# Patient Record
Sex: FEMALE | Race: WHITE | NOT HISPANIC OR LATINO | URBAN - METROPOLITAN AREA
[De-identification: names, ages, dates, MRNs, and addresses within clinical notes are randomized per-mention and may not be internally consistent; named-entity substitution may affect disease eponyms.]

---
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
## 2020-06-23 PROBLEM — Z00.00 ENCOUNTER FOR PREVENTIVE HEALTH EXAMINATION: Status: ACTIVE | Noted: 2020-06-23

## 2020-12-01 ENCOUNTER — TRANSCRIPTION ENCOUNTER (OUTPATIENT)
Age: 20
End: 2020-12-01

## 2020-12-17 ENCOUNTER — NON-APPOINTMENT (OUTPATIENT)
Age: 20
End: 2020-12-17

## 2020-12-18 ENCOUNTER — NON-APPOINTMENT (OUTPATIENT)
Age: 20
End: 2020-12-18

## 2020-12-18 ENCOUNTER — APPOINTMENT (OUTPATIENT)
Dept: HEART AND VASCULAR | Facility: CLINIC | Age: 20
End: 2020-12-18
Payer: COMMERCIAL

## 2020-12-18 VITALS
BODY MASS INDEX: 16.56 KG/M2 | HEART RATE: 75 BPM | WEIGHT: 97 LBS | DIASTOLIC BLOOD PRESSURE: 60 MMHG | SYSTOLIC BLOOD PRESSURE: 100 MMHG | HEIGHT: 64 IN

## 2020-12-18 VITALS — TEMPERATURE: 97.8 F

## 2020-12-18 DIAGNOSIS — E78.5 HYPERLIPIDEMIA, UNSPECIFIED: ICD-10-CM

## 2020-12-18 DIAGNOSIS — Z85.820 PERSONAL HISTORY OF MALIGNANT MELANOMA OF SKIN: ICD-10-CM

## 2020-12-18 PROCEDURE — 99072 ADDL SUPL MATRL&STAF TM PHE: CPT

## 2020-12-18 PROCEDURE — 99204 OFFICE O/P NEW MOD 45 MIN: CPT

## 2020-12-18 PROCEDURE — 93000 ELECTROCARDIOGRAM COMPLETE: CPT

## 2020-12-18 RX ORDER — NORGESTIMATE AND ETHINYL ESTRADIOL 0.25-0.035
KIT ORAL
Refills: 0 | Status: ACTIVE | COMMUNITY

## 2020-12-18 NOTE — HISTORY OF PRESENT ILLNESS
[FreeTextEntry1] : 21 yo F with PMHx of melanoma s/p resection (2014) who presents for evaluation of hyperlipidemia. \par \par Her mother is a patient of our clinic. She had blood work done March 2020 which showed an , , HDL 55 and VLDL 38. Two years prior 6/2018 to that her TG was 139, , HDL 48. She was advised to change her diet.  Typical diet includes foods such as "protein plant based pancakes," eggs with toast, pasta, uses cheese with pancakes, sandwiches. Cooks with butter, eats red meat 4 times per week. Started taking birth control 2016 tri-sprintec. She denies any symptoms including chest pain, dyspnea, palpitations, LE edema or palpitations.\par \par She feels that she is losing weight in the past year for unclear reasons and is unsure why. She is hungry when she wakes up and eats three meals per day. \par \par Father has high HLD, not sure when he was diagnosed.Says both her grandparents have "heart problems."\par \par She visiting home from [place], returns Jan 25th. 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 \par \par Relevant labs:\par _______________________________\par 3/6/2020: \par , , HDL 55, VLDL 38\par \par 6/5/2018\par ,,  HDL 48, VLDL 28\par \par \par Do you have polycystic ovarian syndrome? no		\par Have you ever been pregnant?  no		If so, how many times?\par Did you have any complications during pregnancy? -\par Did you have any postpartum complications? -\par Have you had any miscarriages? -	If so, how many? \par Have you gone through menopause? -	If yes, at what age? \par Did you receive hormone replacement?	-\par \par Lifestyle History:\par Mediterranean Diet Score (9 question survey) was 4\par (8-9: optimal, 6-7: near-optimal, 4-5: suboptimal, 0-3: markedly suboptimal)\par Exercise: Patient reports exercising <40  minutes per week. \par Smoking: Never smoker \par Stress: Patient reports stress several days \par \par  \par \par \par \par

## 2020-12-18 NOTE — DISCUSSION/SUMMARY
[FreeTextEntry1] : 21 yo F with PMHx of melanoma s/p resection (2014) who presents for evaluation of hyperlipidemia. \par \par Dyslipidemia\par - May be multifactorial - hereditary/primary vs lifestyle vs ?OCP (less likely)  \par - 3/2020: , , HDL 55, VLDL 38\par - Will repeat lipid profile and lp(a) today\par - Recommended she attempt dietary modifications, regular exercise and we will recheck again in a few months\par - She will make a follow up with us in April, 2021

## 2020-12-18 NOTE — PHYSICAL EXAM
[General Appearance - Well Developed] : well developed [Normal Appearance] : normal appearance [Well Groomed] : well groomed [General Appearance - Well Nourished] : well nourished [No Deformities] : no deformities [General Appearance - In No Acute Distress] : no acute distress [Heart Rate And Rhythm] : heart rate and rhythm were normal [Heart Sounds] : normal S1 and S2 [Murmurs] : no murmurs present [Respiration, Rhythm And Depth] : normal respiratory rhythm and effort [Exaggerated Use Of Accessory Muscles For Inspiration] : no accessory muscle use [Auscultation Breath Sounds / Voice Sounds] : lungs were clear to auscultation bilaterally [Abdomen Soft] : soft [Abdomen Tenderness] : non-tender [Abdomen Mass (___ Cm)] : no abdominal mass palpated [Abnormal Walk] : normal gait [Gait - Sufficient For Exercise Testing] : the gait was sufficient for exercise testing [Nail Clubbing] : no clubbing of the fingernails [Cyanosis, Localized] : no localized cyanosis [Petechial Hemorrhages (___cm)] : no petechial hemorrhages [Skin Color & Pigmentation] : normal skin color and pigmentation [] : no rash [No Venous Stasis] : no venous stasis [Skin Lesions] : no skin lesions [No Skin Ulcers] : no skin ulcer [No Xanthoma] : no  xanthoma was observed [Oriented To Time, Place, And Person] : oriented to person, place, and time [Affect] : the affect was normal [Mood] : the mood was normal [No Anxiety] : not feeling anxious

## 2020-12-22 ENCOUNTER — NON-APPOINTMENT (OUTPATIENT)
Age: 20
End: 2020-12-22

## 2020-12-22 LAB
APO LP(A) SERPL-MCNC: 60 NMOL/L
CHOLEST SERPL-MCNC: 256 MG/DL
HDLC SERPL-MCNC: 51 MG/DL
LDLC SERPL CALC-MCNC: 177 MG/DL
NONHDLC SERPL-MCNC: 205 MG/DL
TRIGL SERPL-MCNC: 139 MG/DL

## 2021-05-17 ENCOUNTER — TRANSCRIPTION ENCOUNTER (OUTPATIENT)
Age: 21
End: 2021-05-17

## 2021-08-16 ENCOUNTER — APPOINTMENT (OUTPATIENT)
Dept: HEART AND VASCULAR | Facility: CLINIC | Age: 21
End: 2021-08-16

## 2022-11-22 ENCOUNTER — APPOINTMENT (OUTPATIENT)
Dept: OBGYN | Facility: CLINIC | Age: 22
End: 2022-11-22

## 2022-11-28 NOTE — ED ADULT NURSE NOTE - FALL HARM RISK CONCLUSION
Head,  normocephalic,  atraumatic,  Face,  Face within normal limits,  Ears,  External ears within normal limits,  Nose/Nasopharynx,  External nose  normal appearance,  no nasal discharge,  Mouth and Throat,  Oral cavity appearance normal. Mucosa normal. Lips,  Appearance normal  Universal Safety Interventions

## 2023-01-04 NOTE — ED PROVIDER NOTE - DISCUSSED CLINICAL AND RADIOLOGICAL FINDINGS WITH, MDM
General Sunscreen Counseling: I recommended a broad spectrum sunscreen with a SPF of 50 or higher.  I explained that SPF 50 sunscreens block approximately 97 percent of the sun's harmful rays.  Sunscreens should be applied at least 30 minutes prior to expected sun exposure and then every 2 hours after that as long as sun exposure continues. If swimming or exercising sunscreen should be reapplied every 45 minutes to an hour after getting wet or sweating.  One ounce, or the equivalent of a shot glass full of sunscreen, is adequate to protect the skin not covered by a bathing suit. I also recommended a lip balm with a sunscreen as well. Sun protective clothing can be used in lieu of sunscreen but must be worn the entire time you are exposed to the sun's rays. Detail Level: Zone patient/family

## 2023-07-11 ENCOUNTER — APPOINTMENT (OUTPATIENT)
Dept: OBGYN | Facility: CLINIC | Age: 23
End: 2023-07-11

## 2023-12-26 ENCOUNTER — APPOINTMENT (OUTPATIENT)
Dept: HEART AND VASCULAR | Facility: CLINIC | Age: 23
End: 2023-12-26

## 2023-12-27 NOTE — HISTORY OF PRESENT ILLNESS
[FreeTextEntry1] : 22 yo F with PMHx of melanoma s/p resection (2014) who presents for evaluation of hyperlipidemia.   Prior History Her mother is a patient of our clinic. She had blood work done March 2020 which showed an , , HDL 55 and VLDL 38. Two years prior 6/2018 to that her TG was 139, , HDL 48. She was advised to change her diet.  Typical diet includes foods such as "protein plant based pancakes," eggs with toast, pasta, uses cheese with pancakes, sandwiches. Cooks with butter, eats red meat 4 times per week. Started taking birth control 2016 tri-sprintec. She denies any symptoms including chest pain, dyspnea, palpitations, LE edema or palpitations.  She feels that she is losing weight in the past year for unclear reasons and is unsure why. She is hungry when she wakes up and eats three meals per day.   Father has high HLD, not sure when he was diagnosed.Says both her grandparents have "heart problems."  She visiting home from [place], returns Jan 25th. 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   Relevant labs: _______________________________ 12/18/2020- Lpa 60 nmol/L, chol 256, HDL 51, , trig 139, non- 3/6/2020:  , , HDL 55, VLDL 38 6/5/2018 ,,  HDL 48, VLDL 28  Do you have polycystic ovarian syndrome? no		 Have you ever been pregnant?  no		If so, how many times? Did you have any complications during pregnancy? - Did you have any postpartum complications? - Have you had any miscarriages? -	If so, how many?  Have you gone through menopause? -	If yes, at what age?  Did you receive hormone replacement?	-  Lifestyle History: Mediterranean Diet Score (9 question survey) was 4 (8-9: optimal, 6-7: near-optimal, 4-5: suboptimal, 0-3: markedly suboptimal) Exercise: Patient reports exercising <40  minutes per week.  Smoking: Never smoker  Stress: Patient reports stress several days

## 2023-12-27 NOTE — DISCUSSION/SUMMARY
[FreeTextEntry1] : 19 yo F with PMHx of melanoma s/p resection (2014) who presents for evaluation of hyperlipidemia. \par  \par  Dyslipidemia\par  - May be multifactorial - hereditary/primary vs lifestyle vs ?OCP (less likely)  \par  - 3/2020: , , HDL 55, VLDL 38\par  - Will repeat lipid profile and lp(a) today\par  - Recommended she attempt dietary modifications, regular exercise and we will recheck again in a few months\par  - She will make a follow up with us in April, 2021

## 2024-10-31 ENCOUNTER — NON-APPOINTMENT (OUTPATIENT)
Age: 24
End: 2024-10-31

## 2024-10-31 ENCOUNTER — APPOINTMENT (OUTPATIENT)
Dept: HEART AND VASCULAR | Facility: CLINIC | Age: 24
End: 2024-10-31
Payer: COMMERCIAL

## 2024-10-31 VITALS
HEIGHT: 64 IN | OXYGEN SATURATION: 98 % | HEART RATE: 93 BPM | TEMPERATURE: 98.3 F | RESPIRATION RATE: 16 BRPM | DIASTOLIC BLOOD PRESSURE: 73 MMHG | BODY MASS INDEX: 17.93 KG/M2 | SYSTOLIC BLOOD PRESSURE: 106 MMHG | WEIGHT: 105 LBS

## 2024-10-31 DIAGNOSIS — E78.5 HYPERLIPIDEMIA, UNSPECIFIED: ICD-10-CM

## 2024-10-31 PROCEDURE — 93000 ELECTROCARDIOGRAM COMPLETE: CPT

## 2024-10-31 PROCEDURE — G2211 COMPLEX E/M VISIT ADD ON: CPT | Mod: NC

## 2024-10-31 PROCEDURE — 99204 OFFICE O/P NEW MOD 45 MIN: CPT

## 2025-01-02 DIAGNOSIS — R00.2 PALPITATIONS: ICD-10-CM

## 2025-01-02 DIAGNOSIS — R01.1 CARDIAC MURMUR, UNSPECIFIED: ICD-10-CM

## 2025-01-16 ENCOUNTER — APPOINTMENT (OUTPATIENT)
Dept: HEART AND VASCULAR | Facility: CLINIC | Age: 25
End: 2025-01-16
Payer: COMMERCIAL

## 2025-01-16 VITALS
DIASTOLIC BLOOD PRESSURE: 68 MMHG | OXYGEN SATURATION: 98 % | SYSTOLIC BLOOD PRESSURE: 106 MMHG | HEIGHT: 64 IN | TEMPERATURE: 97.8 F | BODY MASS INDEX: 18.1 KG/M2 | HEART RATE: 86 BPM | WEIGHT: 106 LBS

## 2025-01-16 DIAGNOSIS — R00.2 PALPITATIONS: ICD-10-CM

## 2025-01-16 DIAGNOSIS — E78.5 HYPERLIPIDEMIA, UNSPECIFIED: ICD-10-CM

## 2025-01-16 PROCEDURE — 99214 OFFICE O/P EST MOD 30 MIN: CPT

## 2025-03-20 ENCOUNTER — APPOINTMENT (OUTPATIENT)
Dept: HEART AND VASCULAR | Facility: CLINIC | Age: 25
End: 2025-03-20
Payer: COMMERCIAL

## 2025-03-20 VITALS
OXYGEN SATURATION: 97 % | BODY MASS INDEX: 18.1 KG/M2 | HEIGHT: 64 IN | HEART RATE: 103 BPM | DIASTOLIC BLOOD PRESSURE: 56 MMHG | SYSTOLIC BLOOD PRESSURE: 97 MMHG | TEMPERATURE: 97.8 F | WEIGHT: 106 LBS

## 2025-03-20 DIAGNOSIS — E78.5 HYPERLIPIDEMIA, UNSPECIFIED: ICD-10-CM

## 2025-03-20 PROCEDURE — 99214 OFFICE O/P EST MOD 30 MIN: CPT

## 2025-05-08 ENCOUNTER — NON-APPOINTMENT (OUTPATIENT)
Age: 25
End: 2025-05-08

## 2025-05-08 ENCOUNTER — APPOINTMENT (OUTPATIENT)
Dept: HEART AND VASCULAR | Facility: CLINIC | Age: 25
End: 2025-05-08
Payer: COMMERCIAL

## 2025-05-08 VITALS — HEART RATE: 117 BPM | SYSTOLIC BLOOD PRESSURE: 115 MMHG | DIASTOLIC BLOOD PRESSURE: 73 MMHG

## 2025-05-08 VITALS
DIASTOLIC BLOOD PRESSURE: 72 MMHG | OXYGEN SATURATION: 98 % | SYSTOLIC BLOOD PRESSURE: 108 MMHG | TEMPERATURE: 98 F | HEART RATE: 76 BPM | HEIGHT: 64 IN | BODY MASS INDEX: 18.44 KG/M2 | WEIGHT: 108 LBS

## 2025-05-08 DIAGNOSIS — R00.2 PALPITATIONS: ICD-10-CM

## 2025-05-08 DIAGNOSIS — E78.5 HYPERLIPIDEMIA, UNSPECIFIED: ICD-10-CM

## 2025-05-08 PROCEDURE — 99214 OFFICE O/P EST MOD 30 MIN: CPT | Mod: 25

## 2025-05-08 PROCEDURE — 93000 ELECTROCARDIOGRAM COMPLETE: CPT

## 2025-05-14 ENCOUNTER — APPOINTMENT (OUTPATIENT)
Dept: OBGYN | Facility: CLINIC | Age: 25
End: 2025-05-14
Payer: COMMERCIAL

## 2025-05-14 VITALS
DIASTOLIC BLOOD PRESSURE: 72 MMHG | SYSTOLIC BLOOD PRESSURE: 112 MMHG | OXYGEN SATURATION: 95 % | WEIGHT: 108 LBS | HEART RATE: 108 BPM | BODY MASS INDEX: 18.44 KG/M2 | HEIGHT: 64 IN

## 2025-05-14 DIAGNOSIS — Z82.49 FAMILY HISTORY OF ISCHEMIC HEART DISEASE AND OTHER DISEASES OF THE CIRCULATORY SYSTEM: ICD-10-CM

## 2025-05-14 DIAGNOSIS — Z11.3 ENCOUNTER FOR SCREENING FOR INFECTIONS WITH A PREDOMINANTLY SEXUAL MODE OF TRANSMISSION: ICD-10-CM

## 2025-05-14 DIAGNOSIS — Z01.419 ENCOUNTER FOR GYNECOLOGICAL EXAMINATION (GENERAL) (ROUTINE) W/OUT ABNORMAL FINDINGS: ICD-10-CM

## 2025-05-14 DIAGNOSIS — Z78.9 OTHER SPECIFIED HEALTH STATUS: ICD-10-CM

## 2025-05-14 DIAGNOSIS — R10.2 PELVIC AND PERINEAL PAIN: ICD-10-CM

## 2025-05-14 DIAGNOSIS — N94.6 DYSMENORRHEA, UNSPECIFIED: ICD-10-CM

## 2025-05-14 LAB
ALBUMIN SERPL ELPH-MCNC: 4.5 G/DL
ALP BLD-CCNC: 45 U/L
ALT SERPL-CCNC: 15 U/L
ANION GAP SERPL CALC-SCNC: 12 MMOL/L
AST SERPL-CCNC: 18 U/L
BILIRUB SERPL-MCNC: 0.5 MG/DL
BUN SERPL-MCNC: 14 MG/DL
CALCIUM SERPL-MCNC: 9.4 MG/DL
CHLORIDE SERPL-SCNC: 104 MMOL/L
CHOLEST SERPL-MCNC: 206 MG/DL
CO2 SERPL-SCNC: 22 MMOL/L
CREAT SERPL-MCNC: 0.7 MG/DL
EGFRCR SERPLBLD CKD-EPI 2021: 124 ML/MIN/1.73M2
GLUCOSE SERPL-MCNC: 85 MG/DL
HCT VFR BLD CALC: 40.7 %
HDLC SERPL-MCNC: 69 MG/DL
HGB BLD-MCNC: 13.2 G/DL
LDLC SERPL-MCNC: 128 MG/DL
MCHC RBC-ENTMCNC: 31.1 PG
MCHC RBC-ENTMCNC: 32.4 G/DL
MCV RBC AUTO: 96 FL
METANEPHRINE, PL: <25 PG/ML
NONHDLC SERPL-MCNC: 138 MG/DL
NORMETANEPHRINE, PL: 77.4 PG/ML
PLATELET # BLD AUTO: 278 K/UL
POTASSIUM SERPL-SCNC: 4.4 MMOL/L
PROT SERPL-MCNC: 7 G/DL
RBC # BLD: 4.24 M/UL
RBC # FLD: 12.7 %
SODIUM SERPL-SCNC: 138 MMOL/L
TRIGL SERPL-MCNC: 56 MG/DL
TSH SERPL-ACNC: 1.41 UIU/ML
WBC # FLD AUTO: 5.92 K/UL

## 2025-05-14 PROCEDURE — 99385 PREV VISIT NEW AGE 18-39: CPT | Mod: 25

## 2025-05-14 PROCEDURE — 99459 PELVIC EXAMINATION: CPT

## 2025-05-14 PROCEDURE — 76830 TRANSVAGINAL US NON-OB: CPT

## 2025-05-15 LAB
C TRACH RRNA SPEC QL NAA+PROBE: NOT DETECTED
N GONORRHOEA RRNA SPEC QL NAA+PROBE: NOT DETECTED
SOURCE TP AMPLIFICATION: NORMAL

## 2025-05-23 LAB — CYTOLOGY CVX/VAG DOC THIN PREP: ABNORMAL

## 2025-06-05 ENCOUNTER — APPOINTMENT (OUTPATIENT)
Dept: OBGYN | Facility: CLINIC | Age: 25
End: 2025-06-05
Payer: COMMERCIAL

## 2025-06-05 ENCOUNTER — LABORATORY RESULT (OUTPATIENT)
Age: 25
End: 2025-06-05

## 2025-06-05 VITALS
WEIGHT: 108 LBS | OXYGEN SATURATION: 100 % | HEART RATE: 240 BPM | DIASTOLIC BLOOD PRESSURE: 75 MMHG | SYSTOLIC BLOOD PRESSURE: 118 MMHG

## 2025-06-05 DIAGNOSIS — N87.0 MILD CERVICAL DYSPLASIA: ICD-10-CM

## 2025-06-05 DIAGNOSIS — Z71.85 ENCOUNTER FOR IMMUNIZATION SAFETY COUNSELING: ICD-10-CM

## 2025-06-05 PROCEDURE — 57454 BX/CURETT OF CERVIX W/SCOPE: CPT

## 2025-06-09 LAB
CORE LAB BIOPSY: NORMAL
HPV HIGH+LOW RISK DNA PNL CVX: DETECTED

## 2025-07-07 ENCOUNTER — APPOINTMENT (OUTPATIENT)
Dept: NEUROLOGY | Facility: CLINIC | Age: 25
End: 2025-07-07
Payer: COMMERCIAL

## 2025-07-07 VITALS
WEIGHT: 109 LBS | BODY MASS INDEX: 18.61 KG/M2 | DIASTOLIC BLOOD PRESSURE: 80 MMHG | SYSTOLIC BLOOD PRESSURE: 114 MMHG | TEMPERATURE: 96.3 F | HEIGHT: 64 IN | HEART RATE: 118 BPM | OXYGEN SATURATION: 100 %

## 2025-07-07 PROBLEM — R29.2 GENERALIZED HYPERREFLEXIA: Status: ACTIVE | Noted: 2025-07-07

## 2025-07-07 PROCEDURE — G2211 COMPLEX E/M VISIT ADD ON: CPT | Mod: NC

## 2025-07-07 PROCEDURE — 99205 OFFICE O/P NEW HI 60 MIN: CPT

## 2025-07-13 ENCOUNTER — NON-APPOINTMENT (OUTPATIENT)
Age: 25
End: 2025-07-13

## 2025-08-09 ENCOUNTER — APPOINTMENT (OUTPATIENT)
Dept: MRI IMAGING | Facility: CLINIC | Age: 25
End: 2025-08-09
Payer: COMMERCIAL

## 2025-08-09 ENCOUNTER — OUTPATIENT (OUTPATIENT)
Dept: OUTPATIENT SERVICES | Facility: HOSPITAL | Age: 25
LOS: 1 days | End: 2025-08-09

## 2025-08-09 PROCEDURE — 72141 MRI NECK SPINE W/O DYE: CPT | Mod: 26

## 2025-08-09 PROCEDURE — 72146 MRI CHEST SPINE W/O DYE: CPT | Mod: 26

## 2025-08-09 PROCEDURE — 70551 MRI BRAIN STEM W/O DYE: CPT | Mod: 26

## 2025-08-09 PROCEDURE — 76377 3D RENDER W/INTRP POSTPROCES: CPT | Mod: 26

## 2025-08-18 ENCOUNTER — APPOINTMENT (OUTPATIENT)
Dept: NEUROLOGY | Facility: CLINIC | Age: 25
End: 2025-08-18
Payer: COMMERCIAL

## 2025-08-18 DIAGNOSIS — R20.0 ANESTHESIA OF SKIN: ICD-10-CM

## 2025-08-18 DIAGNOSIS — R00.2 PALPITATIONS: ICD-10-CM

## 2025-08-18 PROCEDURE — 99215 OFFICE O/P EST HI 40 MIN: CPT | Mod: 95

## 2025-08-19 ENCOUNTER — TRANSCRIPTION ENCOUNTER (OUTPATIENT)
Age: 25
End: 2025-08-19

## 2025-09-03 ENCOUNTER — APPOINTMENT (OUTPATIENT)
Dept: NEUROLOGY | Facility: CLINIC | Age: 25
End: 2025-09-03